# Patient Record
Sex: FEMALE | Race: WHITE | URBAN - METROPOLITAN AREA
[De-identification: names, ages, dates, MRNs, and addresses within clinical notes are randomized per-mention and may not be internally consistent; named-entity substitution may affect disease eponyms.]

---

## 2024-01-31 ENCOUNTER — OFFICE VISIT (OUTPATIENT)
Dept: OTOLARYNGOLOGY | Facility: CLINIC | Age: 79
End: 2024-01-31

## 2024-01-31 ENCOUNTER — OFFICE VISIT (OUTPATIENT)
Dept: AUDIOLOGY | Facility: CLINIC | Age: 79
End: 2024-01-31

## 2024-01-31 VITALS — WEIGHT: 175 LBS

## 2024-01-31 DIAGNOSIS — H93.11 RIGHT-SIDED TINNITUS: ICD-10-CM

## 2024-01-31 DIAGNOSIS — R43.0 COVID-19 LONG HAULER MANIFESTING CHRONIC LOSS OF SMELL: ICD-10-CM

## 2024-01-31 DIAGNOSIS — H91.93 BILATERAL HEARING LOSS, UNSPECIFIED HEARING LOSS TYPE: Primary | ICD-10-CM

## 2024-01-31 DIAGNOSIS — U09.9 COVID-19 LONG HAULER MANIFESTING CHRONIC LOSS OF SMELL: ICD-10-CM

## 2024-01-31 DIAGNOSIS — H90.3 SENSORINEURAL HEARING LOSS, BILATERAL: Primary | ICD-10-CM

## 2024-01-31 PROCEDURE — 99203 OFFICE O/P NEW LOW 30 MIN: CPT | Performed by: SPECIALIST

## 2024-01-31 PROCEDURE — 92567 TYMPANOMETRY: CPT | Performed by: AUDIOLOGIST

## 2024-01-31 PROCEDURE — 1159F MED LIST DOCD IN RCRD: CPT | Performed by: SPECIALIST

## 2024-01-31 PROCEDURE — 92557 COMPREHENSIVE HEARING TEST: CPT | Performed by: AUDIOLOGIST

## 2024-01-31 PROCEDURE — 1160F RVW MEDS BY RX/DR IN RCRD: CPT | Performed by: SPECIALIST

## 2024-01-31 RX ORDER — LISINOPRIL AND HYDROCHLOROTHIAZIDE 12.5; 1 MG/1; MG/1
TABLET ORAL
COMMUNITY
Start: 2024-01-15

## 2024-02-01 PROBLEM — H90.3 SENSORINEURAL HEARING LOSS, BILATERAL: Status: ACTIVE | Noted: 2024-02-01

## 2024-02-01 NOTE — PATIENT INSTRUCTIONS
An audiogram revealed bilateral mild to moderate sensorineural hearing loss with good word discrimination score at 84%.  You can try aromatherapy which are essential oils that you use 3 at a time to try to retrain your sense of smell which is very likely loss from COVID.  Use your sodium and caffeine for your tinnitus.  Follow-up with any additional questions or problems.

## 2024-02-01 NOTE — PROGRESS NOTES
Mireille Bailey is a 78 year old female.   Chief Complaint   Patient presents with    Ear Problem     Bilateral ear cleaning      Nose Problem     Lack of smell     HPI:   Patient here for decreased hearing.  She has also had a 6-month history of loss of smell.  He has a history of COVID.    Current Outpatient Medications   Medication Sig Dispense Refill    lisinopril-hydroCHLOROthiazide 10-12.5 MG Oral Tab         Past Medical History:   Diagnosis Date    Essential hypertension       Social History:  Social History     Socioeconomic History    Marital status:    Tobacco Use    Smoking status: Never     Passive exposure: Never    Smokeless tobacco: Never   Vaping Use    Vaping Use: Never used   Substance and Sexual Activity    Alcohol use: Never    Drug use: Never        REVIEW OF SYSTEMS:   GENERAL HEALTH: feels well otherwise  GENERAL : denies fever, chills, sweats, weight loss, weight gain  SKIN: denies any unusual skin lesions or rashes  RESPIRATORY: denies shortness of breath with exertion  NEURO: denies headaches    EXAM:   Wt 175 lb (79.4 kg)   System Details   Skin Inspection - Normal.   Constitutional Overall appearance - Normal.   Head/Face Facial features - Normal. Eyebrows - Normal. Skull - Normal.   Eyes Conjunctiva - Right: Normal, Left: Normal. Pupil - Right: Normal, Left: Normal.    Ears Inspection - Right: Normal, Left: Normal.   Canal -nonocclusive cerumen fully cleaned  TM -bilateral tympanosclerosis   Nasal External nose - Normal.   Nasal septum - Normal.  Turbinates -nasal congestion no purulence or polyps noted   Oral/Oropharynx Lips - Normal, Tonsils - Normal, Tongue - Normal    Neck Exam Inspection - Normal. Palpation - Normal. Parotid gland - Normal. Thyroid gland - Normal.  No carotid bruits by auscultation   Lymph Detail Submental. Submandibular. Anterior cervical. Posterior cervical. Supraclavicular all without enlargement   Psychiatric Orientation - Oriented to time, place,  person & situation. Appropriate mood and affect.   Neurological Memory - Normal. Cranial nerves - Cranial nerves II through XII grossly intact.     ASSESSMENT AND PLAN:   1. Bilateral hearing loss, unspecified hearing loss type  Audiogram shows bilateral mild to moderate sensorineural hearing loss with good word discrimination score at 84%.  Patient can consider binaural hearing aids.  - OP REFERRAL TO AUDIOLOGY    2. Right-sided tinnitus  Can reduce sodium and caffeine.  - OP REFERRAL TO AUDIOLOGY    3. 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  Follow-up with any additional questions or problems.      The patient indicates understanding of these issues and agrees to the plan.      Valeria Newman MD  1/31/2024  9:09 PM

## 2024-02-15 ENCOUNTER — TELEPHONE (OUTPATIENT)
Dept: OTOLARYNGOLOGY | Facility: CLINIC | Age: 79
End: 2024-02-15

## 2024-02-15 NOTE — TELEPHONE ENCOUNTER
Pt contacted    Called [facility] at [phone] spoke with Caryn call ref # [id number]     Verified hearing aid benefits, pt does have coverage.   Pt must go through [email or web address] at [phone] or [phone]